# Patient Record
Sex: FEMALE | Race: WHITE | NOT HISPANIC OR LATINO | URBAN - METROPOLITAN AREA
[De-identification: names, ages, dates, MRNs, and addresses within clinical notes are randomized per-mention and may not be internally consistent; named-entity substitution may affect disease eponyms.]

---

## 2021-06-01 ENCOUNTER — EMERGENCY (EMERGENCY)
Facility: HOSPITAL | Age: 61
LOS: 1 days | Discharge: HOME | End: 2021-06-01
Attending: EMERGENCY MEDICINE
Payer: COMMERCIAL

## 2021-06-01 VITALS
HEIGHT: 68 IN | HEART RATE: 89 BPM | OXYGEN SATURATION: 98 % | RESPIRATION RATE: 16 BRPM | WEIGHT: 145.06 LBS | SYSTOLIC BLOOD PRESSURE: 121 MMHG | TEMPERATURE: 98 F | DIASTOLIC BLOOD PRESSURE: 74 MMHG

## 2021-06-01 LAB
ALBUMIN SERPL ELPH-MCNC: 4.4 G/DL — SIGNIFICANT CHANGE UP (ref 3.3–5.2)
ALP SERPL-CCNC: 85 U/L — SIGNIFICANT CHANGE UP (ref 40–120)
ALT FLD-CCNC: 22 U/L — SIGNIFICANT CHANGE UP
ANION GAP SERPL CALC-SCNC: 17 MMOL/L — SIGNIFICANT CHANGE UP (ref 5–17)
APTT BLD: 27.4 SEC — LOW (ref 27.5–35.5)
AST SERPL-CCNC: 28 U/L — SIGNIFICANT CHANGE UP
BASOPHILS # BLD AUTO: 0.05 K/UL — SIGNIFICANT CHANGE UP (ref 0–0.2)
BASOPHILS NFR BLD AUTO: 0.7 % — SIGNIFICANT CHANGE UP (ref 0–2)
BILIRUB SERPL-MCNC: 0.3 MG/DL — LOW (ref 0.4–2)
BLD GP AB SCN SERPL QL: SIGNIFICANT CHANGE UP
BUN SERPL-MCNC: 16.5 MG/DL — SIGNIFICANT CHANGE UP (ref 8–20)
CALCIUM SERPL-MCNC: 9.2 MG/DL — SIGNIFICANT CHANGE UP (ref 8.6–10.2)
CHLORIDE SERPL-SCNC: 102 MMOL/L — SIGNIFICANT CHANGE UP (ref 98–107)
CO2 SERPL-SCNC: 21 MMOL/L — LOW (ref 22–29)
CREAT SERPL-MCNC: 0.69 MG/DL — SIGNIFICANT CHANGE UP (ref 0.5–1.3)
EOSINOPHIL # BLD AUTO: 0.01 K/UL — SIGNIFICANT CHANGE UP (ref 0–0.5)
EOSINOPHIL NFR BLD AUTO: 0.1 % — SIGNIFICANT CHANGE UP (ref 0–6)
GLUCOSE SERPL-MCNC: 142 MG/DL — HIGH (ref 70–99)
HCT VFR BLD CALC: 37.1 % — SIGNIFICANT CHANGE UP (ref 34.5–45)
HGB BLD-MCNC: 12.8 G/DL — SIGNIFICANT CHANGE UP (ref 11.5–15.5)
IMM GRANULOCYTES NFR BLD AUTO: 0.4 % — SIGNIFICANT CHANGE UP (ref 0–1.5)
INR BLD: 0.96 RATIO — SIGNIFICANT CHANGE UP (ref 0.88–1.16)
LYMPHOCYTES # BLD AUTO: 1.02 K/UL — SIGNIFICANT CHANGE UP (ref 1–3.3)
LYMPHOCYTES # BLD AUTO: 15.2 % — SIGNIFICANT CHANGE UP (ref 13–44)
MCHC RBC-ENTMCNC: 29.2 PG — SIGNIFICANT CHANGE UP (ref 27–34)
MCHC RBC-ENTMCNC: 34.5 GM/DL — SIGNIFICANT CHANGE UP (ref 32–36)
MCV RBC AUTO: 84.5 FL — SIGNIFICANT CHANGE UP (ref 80–100)
MONOCYTES # BLD AUTO: 0.44 K/UL — SIGNIFICANT CHANGE UP (ref 0–0.9)
MONOCYTES NFR BLD AUTO: 6.5 % — SIGNIFICANT CHANGE UP (ref 2–14)
NEUTROPHILS # BLD AUTO: 5.17 K/UL — SIGNIFICANT CHANGE UP (ref 1.8–7.4)
NEUTROPHILS NFR BLD AUTO: 77.1 % — HIGH (ref 43–77)
PLATELET # BLD AUTO: 319 K/UL — SIGNIFICANT CHANGE UP (ref 150–400)
POTASSIUM SERPL-MCNC: 3.9 MMOL/L — SIGNIFICANT CHANGE UP (ref 3.5–5.3)
POTASSIUM SERPL-SCNC: 3.9 MMOL/L — SIGNIFICANT CHANGE UP (ref 3.5–5.3)
PROT SERPL-MCNC: 6.8 G/DL — SIGNIFICANT CHANGE UP (ref 6.6–8.7)
PROTHROM AB SERPL-ACNC: 11.2 SEC — SIGNIFICANT CHANGE UP (ref 10.6–13.6)
RBC # BLD: 4.39 M/UL — SIGNIFICANT CHANGE UP (ref 3.8–5.2)
RBC # FLD: 13.2 % — SIGNIFICANT CHANGE UP (ref 10.3–14.5)
SODIUM SERPL-SCNC: 139 MMOL/L — SIGNIFICANT CHANGE UP (ref 135–145)
TROPONIN T SERPL-MCNC: <0.01 NG/ML — SIGNIFICANT CHANGE UP (ref 0–0.06)
WBC # BLD: 6.72 K/UL — SIGNIFICANT CHANGE UP (ref 3.8–10.5)
WBC # FLD AUTO: 6.72 K/UL — SIGNIFICANT CHANGE UP (ref 3.8–10.5)

## 2021-06-01 PROCEDURE — 0042T: CPT

## 2021-06-01 PROCEDURE — 99220: CPT

## 2021-06-01 PROCEDURE — 70498 CT ANGIOGRAPHY NECK: CPT | Mod: 26,MA

## 2021-06-01 PROCEDURE — 70496 CT ANGIOGRAPHY HEAD: CPT | Mod: 26,MA

## 2021-06-01 PROCEDURE — 99284 EMERGENCY DEPT VISIT MOD MDM: CPT

## 2021-06-01 PROCEDURE — 93010 ELECTROCARDIOGRAM REPORT: CPT | Mod: 76

## 2021-06-01 PROCEDURE — 71045 X-RAY EXAM CHEST 1 VIEW: CPT | Mod: 26

## 2021-06-01 PROCEDURE — 70450 CT HEAD/BRAIN W/O DYE: CPT | Mod: 26,59,MA

## 2021-06-01 PROCEDURE — 93306 TTE W/DOPPLER COMPLETE: CPT | Mod: 26

## 2021-06-01 PROCEDURE — 99223 1ST HOSP IP/OBS HIGH 75: CPT

## 2021-06-01 RX ORDER — MECLIZINE HCL 12.5 MG
50 TABLET ORAL ONCE
Refills: 0 | Status: COMPLETED | OUTPATIENT
Start: 2021-06-01 | End: 2021-06-01

## 2021-06-01 RX ORDER — ONDANSETRON 8 MG/1
4 TABLET, FILM COATED ORAL ONCE
Refills: 0 | Status: COMPLETED | OUTPATIENT
Start: 2021-06-01 | End: 2021-06-01

## 2021-06-01 RX ORDER — BUPROPION HYDROCHLORIDE 150 MG/1
150 TABLET, EXTENDED RELEASE ORAL DAILY
Refills: 0 | Status: DISCONTINUED | OUTPATIENT
Start: 2021-06-01 | End: 2021-06-06

## 2021-06-01 RX ORDER — METOPROLOL TARTRATE 50 MG
100 TABLET ORAL ONCE
Refills: 0 | Status: COMPLETED | OUTPATIENT
Start: 2021-06-02 | End: 2021-06-02

## 2021-06-01 RX ORDER — METOPROLOL TARTRATE 50 MG
100 TABLET ORAL ONCE
Refills: 0 | Status: COMPLETED | OUTPATIENT
Start: 2021-06-01 | End: 2021-06-01

## 2021-06-01 RX ADMIN — Medication 50 MILLIGRAM(S): at 15:01

## 2021-06-01 RX ADMIN — ONDANSETRON 4 MILLIGRAM(S): 8 TABLET, FILM COATED ORAL at 15:02

## 2021-06-01 NOTE — ED PROVIDER NOTE - PROGRESS NOTE DETAILS
On reeval, pt reports that her dizziness has improved and is now sitting up in bed with eyes open, prior to receving any medications. States that chest pressure and L arm numbness is still present, but dizziness has resolved.     D/w code stroke neurologist dr. linton states that given pts improvement in sx and outside tpa window agrees with plan for no tpa. If pt remains asymptomatic can receive MRI in obs. - Natan Root, PGY-2

## 2021-06-01 NOTE — CONSULT NOTE ADULT - SUBJECTIVE AND OBJECTIVE BOX
[facility]                                        Neurology at [place]                                  [facility]                                      [address]                                           [place], [place], [address]                                                [phone]        CC: Dizziness.     HISTORY:  The patient is a 60y Female who presented with the complaint of dizziness.   She reports that the onset was around 8 am. She felt associated nausea and had some pressure in her chest.   She described some tingling down her left arm.   Upon arrival in the ER the stroke code was activated and I had spoken with the ER team Emily Root (resident)/Dayday (attending).     She is currently feeling much better although not quite back to normal.     PAST MEDICAL & SURGICAL HISTORY:  Depression  No significant past surgical history    MEDICATION PRIOR TO ADMISSION:  Wellbutrin    Allergies  codeine (Unknown)    SOCIAL HISTORY:  Non smoker.     FAMILY HISTORY:  No known history of stroke (mother/father).    ROS:  Constitutional: The patient denies fevers or weight changes.  Neuro: As per HPI.  Eyes: Denies blurry vision.  Ears/nose/throat: Denies Tinnitus.   Cardiac: Denies chest pain. Denies palpitations.  Respiratory: Denies shortness of breath.  GI: Denies abdominal pain, nausea, or vomiting.  : Denies change in urinary pattern.  Integumentary: Denies rash.  Psych: Denies recent mood changes.  Heme: denies easy bleeding/bruising.    Exam:  Vital Signs Last 24 Hrs  T(C): 36.8 (01 Jun 2021 13:39), Max: 36.8 (01 Jun 2021 13:39)  T(F): 98.3 (01 Jun 2021 13:39), Max: 98.3 (01 Jun 2021 13:39)  HR: 89 (01 Jun 2021 13:39) (89 - 89)  BP: 121/74 (01 Jun 2021 13:39) (121/74 - 121/74)  RR: 16 (01 Jun 2021 13:39) (16 - 16)  SpO2: 98% (01 Jun 2021 13:39) (98% - 98%)  General: NAD.   Carotid bruits absent.     Mental status: The patient is awake, alert, and fully oriented. There is no aphasia. Attention span is normal. Patient is aware of current events.     Cranial nerves: There is no papilledema. Pupils react symmetrically to light. There is no visual field deficit to confrontation. Extraocular motion is full with no nystagmus.  Facial sensation is intact. Facial musculature is symmetric. Palate elevates symmetrically. Tongue is midline.    Motor: There is normal bulk and tone.  Strength is 5/5 in the right arm and leg.   Strength is 5/5 in the left arm and leg.    Sensation: Intact to light touch and pin. There is no extinction to double simultaneous stimulation.    Reflexes: 2+ throughout and plantar responses are flexor.    Cerebellar: There is no dysmetria on finger to nose testing.    [facility] [email or web address]] SS:   Date: 6/1/21  Time:   1a) Level of consciousness (0-3): 0  1b) Questions (0-2): 0  1c) Commands (0-2): 0  2  ) Gaze (0-2): 0  3  ) Visual field (0-3): 0  4  ) Facial palsy (0-3): 0  Motor  5a) Left arm (0-4): 0  5b) Right arm (0-4): 0  6a) Left leg (0-4): 0  6b) Right leg (0-4): 0  7  ) Ataxia (0-2): 0  Sensory  8  ) Sensory (0-2): 0  Speech  9  ) Language (0-3): 0  10) Dysarthria (0-2): 0  Extinction  11) Extinction/inattention (0-2): 0    Total score: 0    Prestroke Modified Payne: 0    (0: No symptoms and no disability.  1: No significant disability despite symptoms; able to carry out all usual duties and activities.  2: Slight disability; unable to carry out all previous activity but able to look after own affairs without assistance.  3: Moderate disability; requiring some help but able to walk without assistance.   4: Moderately severe disability; unable to walk without assistance and unable to attend to own bodily needs without assistance.  5: Severe disability; bedridden, incontinent and requiring constant nursing care and attention.   6: Dead. )     LABS:                         12.8   6.72  )-----------( 319      ( 01 Jun 2021 14:20 )             37.1       06-01    139  |  102  |  16.5  ----------------------------<  142<H>  3.9   |  21.0<L>  |  0.69    Ca    9.2      01 Jun 2021 14:20    TPro  6.8  /  Alb  4.4  /  TBili  0.3<L>  /  DBili  x   /  AST  28  /  ALT  22  /  AlkPhos  85  06-01      PT/INR - ( 01 Jun 2021 14:20 )   PT: 11.2 sec;   INR: 0.96 ratio         PTT - ( 01 Jun 2021 14:20 )  PTT:27.4 sec    RADIOLOGY   CT head images reviewed (and concur with report): There is no acute pathology.   CT-A negative for large vessel occlusion.  CT-P negative for core/penumbra.

## 2021-06-01 NOTE — ED CDU PROVIDER INITIAL DAY NOTE - CROS ED SKIN ALL NEG
----- Message from Angela Singh sent at 5/1/2018  7:40 AM CDT -----  Contact: Self Call  [phone]  Patient stated his pharmacy said you were holding up getting his medication filled due to them needing a diagnostic code for the medication. He need his Rx today because he has taken his last.       [facility]  [phone] (Phone)    [phone] (Fax)  
See previous call to [facility].  
negative...

## 2021-06-01 NOTE — ED PROVIDER NOTE - CLINICAL SUMMARY MEDICAL DECISION MAKING FREE TEXT BOX
Pt presenting with acute onset of dizziness and L arm numbness with lkw today at 0800. Code stroke called, pt taken directly to CT scan, CTH, CTA h/n, CTP, labs, trop, vbg, ekg ordered. Given concerning hx of chest pain will evaluate for acs with repeat trops and cardio cx.

## 2021-06-01 NOTE — CONSULT NOTE ADULT - SUBJECTIVE AND OBJECTIVE BOX
[place] [facility]                                                               Office:  [address] [place] [address]                                                              Telephone: [phone]. Fax:[phone]                                                                        CARDIOLOGY CONSULTATION NOTE                                                                                             Consult requested by: Dr. Ivett Gama   Reason for Consultation: Chest Pain  PMD:  Primary Cardiology:  History obtained by: Patient and medical record   obtained: No    Chief complaint:    Patient is a 60y old  Female who presents with a chief complaint of chest pain      HPI: Pt is a 59 y/o female with medical history of         REVIEW OF SYMPTOMS:     CONSTITUTIONAL: No fever, weight loss, or fatigue  ENMT:  No difficulty hearing, tinnitus, vertigo; No sinus or throat pain  NECK: No pain or stiffness  CARDIOVASCULAR: See HPI  RESPIRATORY: No Dyspnea on exertion, Shortness of breath, cough, wheezing  : No dysuria, no hematuria   GI: No dark color stool, no melena, no diarrhea, no constipation, no abdominal pain   NEURO: No headache, no dizziness, no slurred speech   MUSCULOSKELETAL: No joint pain or swelling; No muscle, back, or extremity pain  PSYCH: No agitation, no anxiety.    ALL OTHER REVIEW OF SYSTEMS ARE NEGATIVE.      PREVIOUS DIAGNOSTIC TESTING  ECHO FINDINGS:       STRESS FINDINGS:      CATHETERIZATION FINDINGS:         ALLERGIES: Allergies    codeine (Unknown)    Intolerances          PAST MEDICAL HISTORY  Depression        PAST SURGICAL HISTORY  No significant past surgical history        FAMILY HISTORY:      SOCIAL HISTORY:  Denies smoking/alcohol/drugs  CIGARETTES:     ALCOHOL:  DRUGS:      CURRENT MEDICATIONS:           HOME MEDICATIONS:        Vital Signs Last 24 Hrs  T(C): 36.8 (01 Jun 2021 13:39), Max: 36.8 (01 Jun 2021 13:39)  T(F): 98.3 (01 Jun 2021 13:39), Max: 98.3 (01 Jun 2021 13:39)  HR: 89 (01 Jun 2021 13:39) (89 - 89)  BP: 121/74 (01 Jun 2021 13:39) (121/74 - 121/74)  RR: 16 (01 Jun 2021 13:39) (16 - 16)  SpO2: 98% (01 Jun 2021 13:39) (98% - 98%)      PHYSICAL EXAM:  Constitutional: Comfortable . No acute distress.   HEENT: Atraumatic and normocephalic , neck is supple . no JVD. No carotid bruit. PEERL   CNS: A&Ox3. No focal deficits. EOMI.   Lymph Nodes: Cervical : Not palpable.  Respiratory: CTAB  Cardiovascular: S1S2 RRR. No murmur/rubs or gallop.  Gastrointestinal: Soft non-tender and non distended . +Bowel sounds. negative Tao's sign.  Extremities: No edema.   Psychiatric: Calm . no agitation.  Skin: No skin rash/ulcers visualized to face, hands or feet.    Intake and output:     LABS:                        12.8   6.72  )-----------( 319      ( 01 Jun 2021 14:20 )             37.1     06-01    139  |  102  |  16.5  ----------------------------<  142<H>  3.9   |  21.0<L>  |  0.69    Ca    9.2      01 Jun 2021 14:20    TPro  6.8  /  Alb  4.4  /  TBili  0.3<L>  /  DBili  x   /  AST  28  /  ALT  22  /  AlkPhos  85  06-01    CARDIAC MARKERS ( 01 Jun 2021 14:20 )  x     / <0.01 ng/mL / x     / x     / x        ;p-BNP=  PT/INR - ( 01 Jun 2021 14:20 )   PT: 11.2 sec;   INR: 0.96 ratio         PTT - ( 01 Jun 2021 14:20 )  PTT:27.4 sec      INTERPRETATION OF TELEMETRY:   ECG: NSR HR @ 88,     RADIOLOGY & ADDITIONAL STUDIES:    X-ray:  < from: Xray Chest 1 View- PORTABLE-Urgent (06.01.21 @ 14:39) >  IMPRESSION:  No acute radiographic findings          CT scan: < from: CT Angio Head w/ IV Cont (06.01.21 @ 14:39) >    IMPRESSION:    CT PERFUSION demonstrated: No core infarct. No active ischemia. Suboptimal arterial bolus.  If symptoms persist consider follow up head CT or MRI, MRA  if no contraindication.    CTA COW:  Patent intracranial circulation without flow limiting stenosis.    CTA NECK: Patent, ECAs, ICAs, no  hemodynamically significant stenosis at  ICA origins by NASCET criteria.  Bilateral vertebral arteries are patent without flow limiting stenosis.    < from: CT Brain Stroke Protocol (06.01.21 @ 14:26) >    IMPRESSION:  HEAD CT: Mild volume loss, microvascular disease, no acute hemorrhage or midline shift.  Suboptimal positioning.          MRI: PENDING                                                                          [place] [facility]                                                               Office:  [address] [address]                                                              Telephone: [phone]. Fax:[phone]                                                                        CARDIOLOGY CONSULTATION NOTE                                                                                             Consult requested by: Dr. Ivett Gama   Reason for Consultation: Chest Pain  PMD:  Primary Cardiology:  History obtained by: Patient and medical record   obtained: No    Chief complaint:    Patient is a 60y old  Female who presents with a chief complaint of chest pain      HPI: Pt is a 61 y/o female with hx of depression who presents to [facility] for chest pain and dizziness.        REVIEW OF SYMPTOMS:     CONSTITUTIONAL: No fever, weight loss, or fatigue  ENMT:  No difficulty hearing, tinnitus, vertigo; No sinus or throat pain  NECK: No pain or stiffness  CARDIOVASCULAR: See HPI  RESPIRATORY: No Dyspnea on exertion, Shortness of breath, cough, wheezing  : No dysuria, no hematuria   GI: No dark color stool, no melena, no diarrhea, no constipation, no abdominal pain   NEURO: No headache, no dizziness, no slurred speech   MUSCULOSKELETAL: No joint pain or swelling; No muscle, back, or extremity pain  PSYCH: No agitation, no anxiety.    ALL OTHER REVIEW OF SYSTEMS ARE NEGATIVE.      PREVIOUS DIAGNOSTIC TESTING  ECHO FINDINGS:       STRESS FINDINGS:      CATHETERIZATION FINDINGS:         ALLERGIES: Allergies    codeine (Unknown)    Intolerances          PAST MEDICAL HISTORY  Depression        PAST SURGICAL HISTORY  No significant past surgical history        FAMILY HISTORY:      SOCIAL HISTORY:  Denies smoking/alcohol/drugs  CIGARETTES:     ALCOHOL:  DRUGS:      CURRENT MEDICATIONS:           HOME MEDICATIONS:        Vital Signs Last 24 Hrs  T(C): 36.8 (01 Jun 2021 13:39), Max: 36.8 (01 Jun 2021 13:39)  T(F): 98.3 (01 Jun 2021 13:39), Max: 98.3 (01 Jun 2021 13:39)  HR: 89 (01 Jun 2021 13:39) (89 - 89)  BP: 121/74 (01 Jun 2021 13:39) (121/74 - 121/74)  RR: 16 (01 Jun 2021 13:39) (16 - 16)  SpO2: 98% (01 Jun 2021 13:39) (98% - 98%)      PHYSICAL EXAM:  Constitutional: Comfortable . No acute distress.   HEENT: Atraumatic and normocephalic , neck is supple . no JVD. No carotid bruit. PEERL   CNS: A&Ox3. No focal deficits. EOMI.   Lymph Nodes: Cervical : Not palpable.  Respiratory: CTAB  Cardiovascular: S1S2 RRR. No murmur/rubs or gallop.  Gastrointestinal: Soft non-tender and non distended . +Bowel sounds. negative Tao's sign.  Extremities: No edema.   Psychiatric: Calm . no agitation.  Skin: No skin rash/ulcers visualized to face, hands or feet.    Intake and output:     LABS:                        12.8   6.72  )-----------( 319      ( 01 Jun 2021 14:20 )             37.1     06-01    139  |  102  |  16.5  ----------------------------<  142<H>  3.9   |  21.0<L>  |  0.69    Ca    9.2      01 Jun 2021 14:20    TPro  6.8  /  Alb  4.4  /  TBili  0.3<L>  /  DBili  x   /  AST  28  /  ALT  22  /  AlkPhos  85  06-01    CARDIAC MARKERS ( 01 Jun 2021 14:20 )  x     / <0.01 ng/mL / x     / x     / x        ;p-BNP=  PT/INR - ( 01 Jun 2021 14:20 )   PT: 11.2 sec;   INR: 0.96 ratio         PTT - ( 01 Jun 2021 14:20 )  PTT:27.4 sec      INTERPRETATION OF TELEMETRY:   ECG: NSR HR @ 88,     RADIOLOGY & ADDITIONAL STUDIES:    X-ray:  < from: Xray Chest 1 View- PORTABLE-Urgent (06.01.21 @ 14:39) >  IMPRESSION:  No acute radiographic findings          CT scan: < from: CT Angio Head w/ IV Cont (06.01.21 @ 14:39) >    IMPRESSION:    CT PERFUSION demonstrated: No core infarct. No active ischemia. Suboptimal arterial bolus.  If symptoms persist consider follow up head CT or MRI, MRA  if no contraindication.    CTA COW:  Patent intracranial circulation without flow limiting stenosis.    CTA NECK: Patent, ECAs, ICAs, no  hemodynamically significant stenosis at  ICA origins by NASCET criteria.  Bilateral vertebral arteries are patent without flow limiting stenosis.    < from: CT Brain Stroke Protocol (06.01.21 @ 14:26) >    IMPRESSION:  HEAD CT: Mild volume loss, microvascular disease, no acute hemorrhage or midline shift.  Suboptimal positioning.          MRI: PENDING                                                                          [place] [facility]                                                               Office:  [address] [address]                                                              Telephone: [phone]. Fax:[phone]                                                                        CARDIOLOGY CONSULTATION NOTE                                                                                             Consult requested by: Dr. Ivett Gama   Reason for Consultation: Chest Pain  PMD: None  Primary Cardiology: None  History obtained by: Patient and medical record   obtained: No    Chief complaint:    Patient is a 60y old  Female who presents with a chief complaint of chest pain      HPI: Pt is a 59 y/o female with hx of depression who presents to [facility]ED for chest pain and dizziness. Pt states that this morning she had 2 episodes of nausea and vomitting. Pt continued to feel unwell but she had to get her taxes done so she drove, but as she was driving she started feeling left arm tingling/numbness with associated chest pressure/tighness. Pt called neighbor to take her to hospital because she felt as though she was having a heart attack. While patient was being driven to hospital, she vomitted again and felt dizziness and neighbor stopped vehicle and called 911.  In ED, Trop#1-negative, ECG-NSR HR @ 83, CT Head- Mild volume loss, microvascular disease, no acute hemorrhage or midline shift. Suboptimal positioning, Xray-No acute radiographic findings.         REVIEW OF SYMPTOMS:     CONSTITUTIONAL: No fever, weight loss, or fatigue  ENMT:  No difficulty hearing, tinnitus, vertigo; No sinus or throat pain  NECK: No pain or stiffness  CARDIOVASCULAR: See HPI  RESPIRATORY: No Dyspnea on exertion, Shortness of breath, cough, wheezing  : No dysuria, no hematuria   GI: No dark color stool, no melena, no diarrhea, no constipation, no abdominal pain   NEURO: No headache, no dizziness, no slurred speech   MUSCULOSKELETAL: No joint pain or swelling; No muscle, back, or extremity pain  PSYCH: No agitation, no anxiety.    ALL OTHER REVIEW OF SYSTEMS ARE NEGATIVE.      ALLERGIES: Allergies    codeine (Unknown)    Intolerances          PAST MEDICAL HISTORY  Depression        PAST SURGICAL HISTORY  No significant past surgical history        FAMILY HISTORY:  CABG-Father age 70    SOCIAL HISTORY:    CIGARETTES:   Denies  ALCOHOL: Minimal  DRUGS: Denies      CURRENT MEDICATIONS:           HOME MEDICATIONS:  Prozac  Welbutrin      Vital Signs Last 24 Hrs  T(C): 36.8 (01 Jun 2021 13:39), Max: 36.8 (01 Jun 2021 13:39)  T(F): 98.3 (01 Jun 2021 13:39), Max: 98.3 (01 Jun 2021 13:39)  HR: 89 (01 Jun 2021 13:39) (89 - 89)  BP: 121/74 (01 Jun 2021 13:39) (121/74 - 121/74)  RR: 16 (01 Jun 2021 13:39) (16 - 16)  SpO2: 98% (01 Jun 2021 13:39) (98% - 98%)      PHYSICAL EXAM:  Constitutional: Comfortable . No acute distress.   HEENT: Atraumatic and normocephalic , neck is supple . no JVD. No carotid bruit. PEERL   CNS: A&Ox3. No focal deficits. EOMI, left sided nystagmus when looking to the left   Lymph Nodes: Cervical : Not palpable.  Respiratory: CTAB  Cardiovascular: S1S2 RRR. No murmur/rubs or gallop.  Gastrointestinal: Soft non-tender and non distended . +Bowel sounds. negative Tao's sign.  Extremities: No edema.   Psychiatric: Calm . no agitation.  Skin: No skin rash/ulcers visualized to face, hands or feet.    Intake and output:     LABS:                        12.8   6.72  )-----------( 319      ( 01 Jun 2021 14:20 )             37.1     06-01    139  |  102  |  16.5  ----------------------------<  142<H>  3.9   |  21.0<L>  |  0.69    Ca    9.2      01 Jun 2021 14:20    TPro  6.8  /  Alb  4.4  /  TBili  0.3<L>  /  DBili  x   /  AST  28  /  ALT  22  /  AlkPhos  85  06-01    CARDIAC MARKERS ( 01 Jun 2021 14:20 )  x     / <0.01 ng/mL / x     / x     / x        ;p-BNP=  PT/INR - ( 01 Jun 2021 14:20 )   PT: 11.2 sec;   INR: 0.96 ratio         PTT - ( 01 Jun 2021 14:20 )  PTT:27.4 sec      INTERPRETATION OF TELEMETRY: No tele  ECG: NSR HR @ 83    RADIOLOGY & ADDITIONAL STUDIES:    X-ray:  < from: Xray Chest 1 View- PORTABLE-Urgent (06.01.21 @ 14:39) >  IMPRESSION:  No acute radiographic findings          CT scan: < from: CT Angio Head w/ IV Cont (06.01.21 @ 14:39) >    IMPRESSION:    CT PERFUSION demonstrated: No core infarct. No active ischemia. Suboptimal arterial bolus.  If symptoms persist consider follow up head CT or MRI, MRA  if no contraindication.    CTA COW:  Patent intracranial circulation without flow limiting stenosis.    CTA NECK: Patent, ECAs, ICAs, no  hemodynamically significant stenosis at  ICA origins by NASCET criteria.  Bilateral vertebral arteries are patent without flow limiting stenosis.    < from: CT Brain Stroke Protocol (06.01.21 @ 14:26) >    IMPRESSION:  HEAD CT: Mild volume loss, microvascular disease, no acute hemorrhage or midline shift.  Suboptimal positioning.          MRI: PENDING

## 2021-06-01 NOTE — ED CDU PROVIDER INITIAL DAY NOTE - MEDICAL DECISION MAKING DETAILS
60f with acute onset of dizziness / chest pain / L arm pain starting at approximately 8am. Code stroke initiated in ED and had no emergent findings on CT head, CTA head / neck, CT perfusion scan. Pt placed in CDU for MR head. Cards evaluated pt and pt to have TTE / cardiac CTA performed.

## 2021-06-01 NOTE — ED PROVIDER NOTE - OBJECTIVE STATEMENT
59 y/o F pt with hx of depression presenting today with c/o dizziness onset this am with lkw approx 8am. Pt states that she woke up feeling at baseline, then approx 8am began to feel markedly dizzy with associated nausea. Shortly after, pt developed a L chest pressure with associated L arm numbness which continued to worsen. Dizziness continued to worsen and pt was soon unable ambulate without assistance. Pt denies hx of similar complaint. Reports diziness is worsened with movement 59 y/o F pt with hx of depression presenting today with c/o dizziness onset this am with lkw approx 8am. Pt states that she woke up feeling at baseline, then approx 8am began to feel markedly dizzy with associated nausea. Shortly after, pt developed a L chest pressure with associated L arm numbness which continued to worsen. Dizziness continued to worsen and pt was soon unable ambulate without assistance. Pt denies hx of similar complaint. Reports diziness is worsened with movement, no hx of vertigo. Pt denies any dyspnea, fevers, abdominal pain, dysuria, headache, cough, congestion, sore throat, neck pain, back pain, weakness, numbness, tingling, syncope, or other complaint. 60 year old F pt with hx of depression presenting today with c/o dizziness onset this am with lkw approx 8am. Pt states that she woke up feeling at baseline, then approx 8am began to feel markedly dizzy with associated nausea. Shortly after, pt developed a L chest pressure with associated L arm numbness which continued to worsen. Dizziness continued to worsen and pt was soon unable ambulate without assistance. Pt denies hx of similar complaint. Reports diziness is worsened with movement, no hx of vertigo. Pt denies any dyspnea, fevers, abdominal pain, dysuria, headache, cough, congestion, sore throat, neck pain, back pain, weakness, numbness, tingling, syncope, or other complaint.

## 2021-06-01 NOTE — ED ADULT NURSE NOTE - OBJECTIVE STATEMENT
pt in no apparent distress, pt co N/V and dizziness that started today, pt also co chest pressure, code stroke activated on pt, MD Root at bedside, pt denies any fevers, chill, SOB

## 2021-06-01 NOTE — ED ADULT NURSE NOTE - NSFALLRSKASSESSTYPE_ED_ALL_ED
Abdominal pain, is \"boiling up\", since yesterday, with vomiting and diarrhea.  Tried to be seen at [facility] ED last night, but wait too long.  Burp tastes like eggs.  Nasty.  Also tired, sleeping a lot, for a long time.  Off iron pills.  Ran out.  Also has headaches.   Initial (On Arrival)

## 2021-06-01 NOTE — CONSULT NOTE ADULT - ATTENDING COMMENTS
pt seen and examined.  Plan of care was DOREEN NP.  Pt with Hx of migraines, with some tingling in left arm. She had similar symptoms except for HA today and had two episodes of vomiting. She then developed left sided chest pressure which she never had.   She had CTA of head and neck, MRI is pending.  Her symptoms improved with use of antivert. No more CP  EKG not ischemic and CE x1 negative.   Plan for cardiac CT in AM. BB to control Hr prior to cardiac CT.   Recycle cardiac enzymes.   We will follow with u   I agree with a/p.

## 2021-06-01 NOTE — ED PROVIDER NOTE - PHYSICAL EXAMINATION
Gen: no acute distress  Head: normocephalic, atraumatic  Lung: CTAB, no respiratory distress, no wheezing, rales, rhonchi  CV: normal s1/s2, rrr,   Abd: soft, non-tender, non-distended  MSK: No edema, no visible deformities, full range of motion in all 4 extremities  Neuro: No focal neurologic deficits, 5/5 strength to all extremities, decreased sensation to lateral L forarm, otherwise sensation intact, CN 2-12 intact, NIHSS 1  Skin: No rash   Psych: normal affect Gen: no acute distress  Head: normocephalic, atraumatic  Lung: CTAB, no respiratory distress, no wheezing, rales, rhonchi  CV: normal s1/s2, rrr,   Abd: soft, non-tender, non-distended  MSK: No edema, no visible deformities, full range of motion in all 4 extremities  Neuro: No focal neurologic deficits, 5/5 strength to all extremities, decreased sensation to lateral L forarm, otherwise sensation intact, CN 2-12 intact, NIHSS 1  Skin: No rash   Psych: normal affect.

## 2021-06-01 NOTE — ED CDU PROVIDER INITIAL DAY NOTE - OBJECTIVE STATEMENT
60 year old F pt with hx of depression presenting today with c/o dizziness onset this am with lkw approx 8am. Pt states that she woke up feeling at baseline, then approx 8am began to feel markedly dizzy with associated nausea. Shortly after, pt developed a L chest pressure with associated L arm numbness which continued to worsen. Dizziness continued to worsen and pt was soon unable ambulate without assistance. Pt denies hx of similar complaint. Reports diziness is worsened with movement, no hx of vertigo. Pt denies any dyspnea, fevers, abdominal pain, dysuria, headache, cough, congestion, sore throat, neck pain, back pain, weakness, numbness, tingling, syncope, or other complaint.

## 2021-06-01 NOTE — ED CDU PROVIDER INITIAL DAY NOTE - ATTENDING CONTRIBUTION TO CARE
seen with acp  pt had dizziness, also strange sensation LUE  had full code stroke and cardio evaluation  [facility] cardio consulting and neurology  plan is MR tomorrow  agree with care plan

## 2021-06-01 NOTE — ED ADULT TRIAGE NOTE - CHIEF COMPLAINT QUOTE
sudden onset nausea around 1030am with left sided chest pain radiating down to left arm. 4 baby ASA via EMS

## 2021-06-01 NOTE — CONSULT NOTE ADULT - ASSESSMENT
The patient is a 60y Female with with dizziness and left arm paresthesias in setting of left sided chest pressure.     Dizziness.   CT negative.  Would not suggest t-PA as outside time window. (Also has mild non disabling deficit).   Would suggest MRI brain.   Lipid panel.  Aspirin daily.   Statin if LDL> 70    Left arm tingling.   Resolved.   Would suggest cardiac work up for ischemia.     Case discussed with ER team (Dr Root resident and Dr Naylor attending).    
Pt is a 59 y/o female with hx of depression who presents to [facility]-ED for chest pain and dizziness. Pt states that this morning she had 2 episodes of nausea and vomitting. Pt continued to feel unwell but she had to get her taxes done so she drove, but as she was driving she started feeling left arm tingling/numbness with associated chest pressure/tighness. Pt called neighbor to take her to hospital because she felt as though she was having a heart attack. While patient was being driven to hospital, she vomitted again and felt dizziness and neighbor stopped vehicle and called 911.  In ED, Trop#1-negative, ECG-NSR HR @ 83, CT Head- Mild volume loss, microvascular disease, no acute hemorrhage or midline shift. Suboptimal positioning, Xray-No acute radiographic findings.     Chest Pain  -ECG-NSR HR @ 83  -Trop#1-negative  -Xray-No acute radiographic findings  -Trend trop  -Plan for CCTA in AM  -Lopressor 100mg ordered to be given once prior to CCTA  -Cont. tele monitoring  -FLP in AM    Dizziness  -CT-Mild volume loss, microvascular disease, no acute hemorrhage or midline shift. Suboptimal positioning  -MR Head- pending  - +nystagmus when looking to the left  - Pt dizziness likely d/t vertigo

## 2021-06-02 VITALS
SYSTOLIC BLOOD PRESSURE: 108 MMHG | HEART RATE: 66 BPM | OXYGEN SATURATION: 98 % | DIASTOLIC BLOOD PRESSURE: 66 MMHG | RESPIRATION RATE: 17 BRPM

## 2021-06-02 LAB
ANION GAP SERPL CALC-SCNC: 10 MMOL/L — SIGNIFICANT CHANGE UP (ref 5–17)
APPEARANCE UR: ABNORMAL
BACTERIA # UR AUTO: ABNORMAL
BILIRUB UR-MCNC: NEGATIVE — SIGNIFICANT CHANGE UP
BUN SERPL-MCNC: 17.6 MG/DL — SIGNIFICANT CHANGE UP (ref 8–20)
CALCIUM SERPL-MCNC: 8.4 MG/DL — LOW (ref 8.6–10.2)
CHLORIDE SERPL-SCNC: 106 MMOL/L — SIGNIFICANT CHANGE UP (ref 98–107)
CHOLEST SERPL-MCNC: 203 MG/DL — HIGH
CO2 SERPL-SCNC: 26 MMOL/L — SIGNIFICANT CHANGE UP (ref 22–29)
COLOR SPEC: YELLOW — SIGNIFICANT CHANGE UP
COMMENT - URINE: SIGNIFICANT CHANGE UP
CREAT SERPL-MCNC: 0.64 MG/DL — SIGNIFICANT CHANGE UP (ref 0.5–1.3)
DIFF PNL FLD: ABNORMAL
EPI CELLS # UR: SIGNIFICANT CHANGE UP
GLUCOSE SERPL-MCNC: 95 MG/DL — SIGNIFICANT CHANGE UP (ref 70–99)
GLUCOSE UR QL: NEGATIVE MG/DL — SIGNIFICANT CHANGE UP
HDLC SERPL-MCNC: 99 MG/DL — SIGNIFICANT CHANGE UP
KETONES UR-MCNC: ABNORMAL
LEUKOCYTE ESTERASE UR-ACNC: ABNORMAL
LIPID PNL WITH DIRECT LDL SERPL: 95 MG/DL — SIGNIFICANT CHANGE UP
NITRITE UR-MCNC: NEGATIVE — SIGNIFICANT CHANGE UP
NON HDL CHOLESTEROL: 104 MG/DL — SIGNIFICANT CHANGE UP
PH UR: 6 — SIGNIFICANT CHANGE UP (ref 5–8)
POTASSIUM SERPL-MCNC: 4.2 MMOL/L — SIGNIFICANT CHANGE UP (ref 3.5–5.3)
POTASSIUM SERPL-SCNC: 4.2 MMOL/L — SIGNIFICANT CHANGE UP (ref 3.5–5.3)
PROT UR-MCNC: 30 MG/DL
RBC CASTS # UR COMP ASSIST: SIGNIFICANT CHANGE UP /HPF (ref 0–4)
SODIUM SERPL-SCNC: 141 MMOL/L — SIGNIFICANT CHANGE UP (ref 135–145)
SP GR SPEC: 1.02 — SIGNIFICANT CHANGE UP (ref 1.01–1.02)
TRIGL SERPL-MCNC: 43 MG/DL — SIGNIFICANT CHANGE UP
UROBILINOGEN FLD QL: 1 MG/DL
WBC UR QL: SIGNIFICANT CHANGE UP

## 2021-06-02 PROCEDURE — 96375 TX/PRO/DX INJ NEW DRUG ADDON: CPT

## 2021-06-02 PROCEDURE — 99213 OFFICE O/P EST LOW 20 MIN: CPT

## 2021-06-02 PROCEDURE — 99285 EMERGENCY DEPT VISIT HI MDM: CPT | Mod: 25

## 2021-06-02 PROCEDURE — 87086 URINE CULTURE/COLONY COUNT: CPT

## 2021-06-02 PROCEDURE — 80053 COMPREHEN METABOLIC PANEL: CPT

## 2021-06-02 PROCEDURE — 75574 CT ANGIO HRT W/3D IMAGE: CPT | Mod: 26,ME

## 2021-06-02 PROCEDURE — 80061 LIPID PANEL: CPT

## 2021-06-02 PROCEDURE — 71045 X-RAY EXAM CHEST 1 VIEW: CPT

## 2021-06-02 PROCEDURE — 81001 URINALYSIS AUTO W/SCOPE: CPT

## 2021-06-02 PROCEDURE — 96374 THER/PROPH/DIAG INJ IV PUSH: CPT

## 2021-06-02 PROCEDURE — 85730 THROMBOPLASTIN TIME PARTIAL: CPT

## 2021-06-02 PROCEDURE — 75574 CT ANGIO HRT W/3D IMAGE: CPT

## 2021-06-02 PROCEDURE — 85610 PROTHROMBIN TIME: CPT

## 2021-06-02 PROCEDURE — 70551 MRI BRAIN STEM W/O DYE: CPT | Mod: 26,MA

## 2021-06-02 PROCEDURE — 93306 TTE W/DOPPLER COMPLETE: CPT

## 2021-06-02 PROCEDURE — G0378: CPT

## 2021-06-02 PROCEDURE — 86901 BLOOD TYPING SEROLOGIC RH(D): CPT

## 2021-06-02 PROCEDURE — 84484 ASSAY OF TROPONIN QUANT: CPT

## 2021-06-02 PROCEDURE — 86900 BLOOD TYPING SEROLOGIC ABO: CPT

## 2021-06-02 PROCEDURE — 36415 COLL VENOUS BLD VENIPUNCTURE: CPT

## 2021-06-02 PROCEDURE — 96361 HYDRATE IV INFUSION ADD-ON: CPT

## 2021-06-02 PROCEDURE — 70450 CT HEAD/BRAIN W/O DYE: CPT

## 2021-06-02 PROCEDURE — 70498 CT ANGIOGRAPHY NECK: CPT

## 2021-06-02 PROCEDURE — 0042T: CPT

## 2021-06-02 PROCEDURE — 85025 COMPLETE CBC W/AUTO DIFF WBC: CPT

## 2021-06-02 PROCEDURE — 70496 CT ANGIOGRAPHY HEAD: CPT

## 2021-06-02 PROCEDURE — 93005 ELECTROCARDIOGRAM TRACING: CPT

## 2021-06-02 PROCEDURE — 86850 RBC ANTIBODY SCREEN: CPT

## 2021-06-02 PROCEDURE — 99217: CPT

## 2021-06-02 PROCEDURE — 70551 MRI BRAIN STEM W/O DYE: CPT

## 2021-06-02 PROCEDURE — 80048 BASIC METABOLIC PNL TOTAL CA: CPT

## 2021-06-02 PROCEDURE — G1004: CPT

## 2021-06-02 PROCEDURE — 99233 SBSQ HOSP IP/OBS HIGH 50: CPT

## 2021-06-02 RX ORDER — KETOROLAC TROMETHAMINE 30 MG/ML
30 SYRINGE (ML) INJECTION ONCE
Refills: 0 | Status: DISCONTINUED | OUTPATIENT
Start: 2021-06-02 | End: 2021-06-02

## 2021-06-02 RX ORDER — METOCLOPRAMIDE HCL 10 MG
10 TABLET ORAL ONCE
Refills: 0 | Status: COMPLETED | OUTPATIENT
Start: 2021-06-02 | End: 2021-06-02

## 2021-06-02 RX ORDER — METOCLOPRAMIDE HCL 10 MG
10 TABLET ORAL ONCE
Refills: 0 | Status: DISCONTINUED | OUTPATIENT
Start: 2021-06-02 | End: 2021-06-02

## 2021-06-02 RX ORDER — ASPIRIN/CALCIUM CARB/MAGNESIUM 324 MG
1 TABLET ORAL
Qty: 30 | Refills: 0
Start: 2021-06-02 | End: 2021-07-01

## 2021-06-02 RX ORDER — SODIUM CHLORIDE 9 MG/ML
500 INJECTION INTRAMUSCULAR; INTRAVENOUS; SUBCUTANEOUS ONCE
Refills: 0 | Status: COMPLETED | OUTPATIENT
Start: 2021-06-02 | End: 2021-06-02

## 2021-06-02 RX ORDER — MECLIZINE HCL 12.5 MG
25 TABLET ORAL EVERY 8 HOURS
Refills: 0 | Status: DISCONTINUED | OUTPATIENT
Start: 2021-06-02 | End: 2021-06-06

## 2021-06-02 RX ORDER — MECLIZINE HCL 12.5 MG
1 TABLET ORAL
Qty: 15 | Refills: 0
Start: 2021-06-02

## 2021-06-02 RX ADMIN — SODIUM CHLORIDE 500 MILLILITER(S): 9 INJECTION INTRAMUSCULAR; INTRAVENOUS; SUBCUTANEOUS at 11:44

## 2021-06-02 RX ADMIN — Medication 100 MILLIGRAM(S): at 11:36

## 2021-06-02 RX ADMIN — Medication 25 MILLIGRAM(S): at 11:44

## 2021-06-02 RX ADMIN — Medication 30 MILLIGRAM(S): at 11:47

## 2021-06-02 RX ADMIN — Medication 30 MILLIGRAM(S): at 12:15

## 2021-06-02 RX ADMIN — SODIUM CHLORIDE 500 MILLILITER(S): 9 INJECTION INTRAMUSCULAR; INTRAVENOUS; SUBCUTANEOUS at 12:44

## 2021-06-02 NOTE — ED CDU PROVIDER DISPOSITION NOTE - CARE PROVIDER_API CALL
David Carr)  Cardiovascular Disease  [address], [address]  [place] [address]  Phone: [phone]  Fax: [phone]  Follow Up Time: Routine    Jaya Max)  Otolaryngology  [address], [address]  [place] [address]  Phone: [phone]  Fax: [phone]  Follow Up Time:     Avila Beebe; PhD)  Neurology; Vascular Neurology  [address], [address]  [place] [address]  Phone: [phone]  Fax: [phone]  Follow Up Time: Routine

## 2021-06-02 NOTE — ED CDU PROVIDER DISPOSITION NOTE - PROVIDER TOKENS
PROVIDER:[TOKEN:[[id number]],FOLLOWUP:[Routine]],PROVIDER:[TOKEN:[[id number]]],PROVIDER:[TOKEN:[[id number]],FOLLOWUP:[Routine]] 167.9

## 2021-06-02 NOTE — PROGRESS NOTE ADULT - SUBJECTIVE AND OBJECTIVE BOX
[place] CARDIOLOGY-[facility]                                                               Office: [address] [address]                                                              Telephone: [phone]. Fax:[phone]                                                                             PROGRESS NOTE  Reason for follow up:   Overnight: No new events.   Update:     Subjective: "  ______________________"      	  Vitals:  T(C): 37 (06-02-21 @ 11:23), Max: 37.3 (06-02-21 @ 07:38)  HR: 75 (06-02-21 @ 11:23) (64 - 89)  BP: 107/66 (06-02-21 @ 11:23) (95/57 - 121/74)  RR: 16 (06-02-21 @ 11:23) (16 - 18)  SpO2: 98% (06-02-21 @ 11:23) (96% - 98%)  Wt(kg): --  I&O's Summary    Weight (kg): 65.8 (06-01 @ 13:39)      PHYSICAL EXAM:  Appearance: Comfortable. No acute distress  HEENT:  Head and neck: Atraumatic. Normocephalic.  Normal oral mucosa, PERRL, Neck is supple. No JVD, No carotid bruit.   Neurologic: A & O x 3, no focal deficits. EOMI.  Lymphatic: No cervical lymphadenopathy  Cardiovascular: Normal S1 S2, No murmur, rubs/gallops. No JVD, No edema  Respiratory: Lungs clear to auscultation  Gastrointestinal:  Soft, Non-tender, + BS  Lower Extremities: No edema  Psychiatry: Patient is calm. No agitation. Mood & affect appropriate  Skin: No rashes/ ecchymoses/cyanosis/ulcers visualized on the face, hands or feet.      CURRENT MEDICATIONS:    buPROPion XL  ketorolac   Injectable      DIAGNOSTIC TESTING:  [ ] Echocardiogram:   [ ]  Catheterization:  [ ] Stress Test:    OTHER: 	      LABS:	 	  CARDIAC MARKERS ( 01 Jun 2021 22:21 )  x     / <0.01 ng/mL / x     / x     / x      p-BNP 01 Jun 2021 22:21: x    , CARDIAC MARKERS ( 01 Jun 2021 18:09 )  x     / <0.01 ng/mL / x     / x     / x      p-BNP 01 Jun 2021 18:09: x    , CARDIAC MARKERS ( 01 Jun 2021 14:20 )  x     / <0.01 ng/mL / x     / x     / x      p-BNP 01 Jun 2021 14:20: x                              12.8   6.72  )-----------( 319      ( 01 Jun 2021 14:20 )             37.1     06-01    139  |  102  |  16.5  ----------------------------<  142<H>  3.9   |  21.0<L>  |  0.69    Ca    9.2      01 Jun 2021 14:20    TPro  6.8  /  Alb  4.4  /  TBili  0.3<L>  /  DBili  x   /  AST  28  /  ALT  22  /  AlkPhos  85  06-01    proBNP:   Lipid Profile: Date: 06-02 @ 05:33  Total cholesterol 203; Direct LDL: --; HDL: 99; Triglycerides:43    HgA1c:   TSH:       TELEMETRY: Reviewed    ECG:  Reviewed by me. 	                                                                      [place] CARDIOLOGY-[facility]                                                               Office: [address] [address]                                                              Telephone: [phone]. Fax:[phone]                                                                             PROGRESS NOTE  Reason for follow up:   Overnight: No new events.   Update: 6/2: Pt denies chest pain, palpitations, SOB. Pt c/o dizziness and headache. Toradol ordered, 500cc IVF. Tele- NSR HR @ 60-70s. Plan for CCTA today 6/2. Pending results of CCTA to determine further ischemia w/u.        Subjective: No       	  Vitals:  T(C): 37 (06-02-21 @ 11:23), Max: 37.3 (06-02-21 @ 07:38)  HR: 75 (06-02-21 @ 11:23) (64 - 89)  BP: 107/66 (06-02-21 @ 11:23) (95/57 - 121/74)  RR: 16 (06-02-21 @ 11:23) (16 - 18)  SpO2: 98% (06-02-21 @ 11:23) (96% - 98%)    I&O's Summary    Weight (kg): 65.8 (06-01 @ 13:39)      PHYSICAL EXAM:  Appearance: Comfortable. No acute distress  HEENT:  Head and neck: Atraumatic. Normocephalic.  Normal oral mucosa, PERRL, Neck is supple. No JVD, No carotid bruit.   Neurologic: A & O x 3, no focal deficits. EOMI.  Lymphatic: No cervical lymphadenopathy  Cardiovascular: Normal S1 S2, No murmur, rubs/gallops. No JVD, No edema  Respiratory: Lungs clear to auscultation  Gastrointestinal:  Soft, Non-tender, + BS  Lower Extremities: No edema  Psychiatry: Patient is calm. No agitation. Mood & affect appropriate  Skin: No rashes/ ecchymoses/cyanosis/ulcers visualized on the face, hands or feet.      CURRENT MEDICATIONS:  buPROPion XL  ketorolac   Injectable      DIAGNOSTIC TESTING:    [ ] Echocardiogram: < from: TTE Echo Complete w/o Contrast w/ Doppler (06.01.21 @ 18:01) >  Summary:   1. Normal left ventricular size and wall thicknesses, with normal systolic and diastolic function.   2. Left ventricular ejection fraction, by visual estimation, is 65 to 70%.   3. Normal right ventricular size and function.   4. The left atrium is normal in size.   5. The right atrium is normal in size.   6. Normal trileaflet aortic valve with normal opening.   7. Trace mitral valve regurgitation.   8. Mild tricuspid regurgitation.      OTHER:   	  MR Head:< from: MR Head No Cont (06.02.21 @ 06:28) >  IMPRESSION:    No evidence for intracranial mass, acute territorial infarct, acute intracranial hemorrhage, or midline shift.    XRAY:< from: Xray Chest 1 View- PORTABLE-Urgent (06.01.21 @ 14:39) >  IMPRESSION:  No acute radiographic findings      LABS:	 	  CARDIAC MARKERS ( 01 Jun 2021 22:21 )  x     / <0.01 ng/mL / x     / x     / x      p-BNP 01 Jun 2021 22:21: x    , CARDIAC MARKERS ( 01 Jun 2021 18:09 )  x     / <0.01 ng/mL / x     / x     / x      p-BNP 01 Jun 2021 18:09: x    , CARDIAC MARKERS ( 01 Jun 2021 14:20 )  x     / <0.01 ng/mL / x     / x     / x      p-BNP 01 Jun 2021 14:20: x                              12.8   6.72  )-----------( 319      ( 01 Jun 2021 14:20 )             37.1     06-01    139  |  102  |  16.5  ----------------------------<  142<H>  3.9   |  21.0<L>  |  0.69    Ca    9.2      01 Jun 2021 14:20    TPro  6.8  /  Alb  4.4  /  TBili  0.3<L>  /  DBili  x   /  AST  28  /  ALT  22  /  AlkPhos  85  06-01      Lipid Profile: Date: 06-02 @ 05:33  Total cholesterol 203; Direct LDL: --; HDL: 99; Triglycerides:43      TELEMETRY: NSR HR @ 60-70s

## 2021-06-02 NOTE — ED CDU PROVIDER DISPOSITION NOTE - PATIENT PORTAL LINK FT
You can access the FollowMyHealth Patient Portal offered by [facility] by registering at the following website: [email or web address]. By joining [employer]’s FollowMyHealth portal, you will also be able to view your health information using other applications (apps) compatible with our system.

## 2021-06-02 NOTE — ED ADULT NURSE REASSESSMENT NOTE - NS ED NURSE REASSESS COMMENT FT1
Pt has had no issues in OBS over night and reports that they feel better. Pt was able to sleep and is currently comfortable without complaints. Pt is AAOx4, in NAD and with VSS.
Pt resting comfortably in stretcher, no complaints at this time.
Received pt from Miguel LUGO. Pt pending CTAC and TTE in the AM. Pt AAOx4, comfortable and has no complaints at this time. Pt updated on plan of care.
Assumed care of the patient @0730. Pt denies chest pain at this time. Pt A&Ox4. Normal sinus rhythm on monitor. Patient in understanding of plan of care. Patient with no further questions for the RN. Resting in comfort. Call bell within reach and encouraged to use when assistance needed. Will continue to monitor.

## 2021-06-02 NOTE — PROGRESS NOTE ADULT - SUBJECTIVE AND OBJECTIVE BOX
[facility]                                        Neurology at [place]                                  [facility]                                      [address]                                           [place], [place], [address]                                                [phone]        CC: Dizziness.     HISTORY:  The patient is a 60y Female who presented with the complaint of dizziness.   She reports that the onset was around 8 am. She felt associated nausea and had some pressure in her chest.   She described some tingling down her left arm.   Upon arrival in the ER the stroke code was activated and I had spoken with the ER team Emily Root (resident)/[facility] (attending).     She is currently feeling much better although not quite back to normal. (JW)    Interval history: feeling better with dizziness, (+) c/o headache    Review of systems (neurology): (+) headache. No dizziness. Denies weakness/numbness.  Denies speech/language deficits. Denies diplopia/blurred vision.  Denies confusion    MEDICATIONS  (STANDING):  buPROPion  milliGRAM(s) Oral daily  metoclopramide Injectable 10 milliGRAM(s) IV Push once    MEDICATIONS  (PRN):  meclizine 25 milliGRAM(s) Oral every 8 hours PRN Dizziness      Vital Signs Last 24 Hrs  T(C): 37 (02 Jun 2021 11:23), Max: 37.3 (02 Jun 2021 07:38)  T(F): 98.6 (02 Jun 2021 11:23), Max: 99.2 (02 Jun 2021 07:38)  HR: 60 (02 Jun 2021 12:23) (60 - 75)  BP: 108/66 (02 Jun 2021 12:23) (95/57 - 108/66)  BP(mean): --  RR: 17 (02 Jun 2021 12:23) (16 - 18)  SpO2: 98% (02 Jun 2021 12:23) (96% - 98%)    Detailed Neuro exam:    Mental status: The patient is awake, alert, and fully oriented. There is no aphasia. Attention span is normal. Patient is aware of current events.     Cranial nerves: There is no papilledema. Pupils react symmetrically to light. There is no visual field deficit to confrontation. Extraocular motion is full with no nystagmus.  Facial sensation is intact. Facial musculature is symmetric. Palate elevates symmetrically. Tongue is midline.    Motor: There is normal bulk and tone.  Strength is 5/5 in the right arm and leg.   Strength is 5/5 in the left arm and leg.    Sensation: Intact to light touch and pin. There is no extinction to double simultaneous stimulation.    Reflexes: 2+ throughout and plantar responses are flexor.    Cerebellar: There is no dysmetria on finger to nose testing.      LABS:                           12.8   6.72  )-----------( 319      ( 01 Jun 2021 14:20 )             37.1     06-02    141  |  106  |  17.6  ----------------------------<  95  4.2   |  26.0  |  0.64    Ca    8.4<L>      02 Jun 2021 12:33    TPro  6.8  /  Alb  4.4  /  TBili  0.3<L>  /  DBili  x   /  AST  28  /  ALT  22  /  AlkPhos  85  06-01    LIVER FUNCTIONS - ( 01 Jun 2021 14:20 )  Alb: 4.4 g/dL / Pro: 6.8 g/dL / ALK PHOS: 85 U/L / ALT: 22 U/L / AST: 28 U/L / GGT: x           PT/INR - ( 01 Jun 2021 14:20 )   PT: 11.2 sec;   INR: 0.96 ratio         PTT - ( 01 Jun 2021 14:20 )  PTT:27.4 sec    Lipid Profile in AM (06.02.21 @ 05:33)    Cholesterol, Serum: 203 mg/dL    Triglycerides, Serum: 43 mg/dL    HDL Cholesterol, Serum: 99 mg/dL    Non HDL Cholesterol: 104: Patients Atherosclerotic Cardiovascular Disease (ASCVD) Risk  Optimal Level (mg/dL)  LDL Cholesterol (LDL-C)  All Patients                                < 100  ASCVD at Very High Risk1    < 70  Non-HDL Cholesterol (Non-HDL-C)  All Patients                       < 130  ASCVD at Very High Risk1   < 100  Non-HDL-Cholesterol (Non-HDL-C) is also a key target for cardiovascular  risk reduction.  Consider Familial Hypercholesterolemia when: LDL-C > 190 mg/dL or  Non-HDL-C > 220 mg/dL.  LDL-C calculation using the Friedewald equation is not provided when  triglycerides > 400 mg/dL, in which case we recommend repeating the test  after fasting, if it was not done before.  When triglycerides >150 mg/dL, calculated LDL-C is provided but maystill  be inaccurate (particularly when LDL-C < 70 mg/dL). It can be  recalculated off-line using other equations (e.g. Tyrese SS, Claudia TORRES,  Zina FRAZIER, et al.RUDOLPH 2013;310:2061 - 8).  1 Philip Downing,et al. "2019 AHA/ACC. . . guideline on the  management of blood cholesterol: a report of the American College of  Cardiology/American Heart Association Task Force on Clinical Practice  Guidelines." Circulation;139:e1082 - e1143.  These values apply only to persons 20 years and older.  Lipid Panel updated with new test, reference ranges and interpretive  comments effective 10-. mg/dL    LDL Cholesterol Calculated: 95 mg/dL        RADIOLOGY   MRI brain- no acute CVA, mass or blood    CT head images reviewed (and concur with report): There is no acute pathology.   CT-A negative for large vessel occlusion.  CT-P negative for core/penumbra.

## 2021-06-02 NOTE — PROGRESS NOTE ADULT - ATTENDING COMMENTS
pt seen and examined  she has a migraine today  CTA chest negative for CAD.  Pt is to follow with neurology.  Cont with home meds.   Can FU with me as outpt on prn basis for now.

## 2021-06-02 NOTE — PROGRESS NOTE ADULT - ASSESSMENT
The patient is a 60y Female with with dizziness and left arm paresthesias in setting of left sided chest pressure.     Dizziness.   MRI brain did not show stroke  Aspirin daily.       Left arm tingling.   Resolved.   Would suggest cardiac work up for ischemia.     No further neuro workup suggested at this time.    Thank you for allowing me to participate in the care of your patient    Avila Beebe MD, PhD   [id number]  
Pt is a 59 y/o female with hx of depression who presents to [facility]ED for chest pain and dizziness. Pt states that this morning she had 2 episodes of nausea and vomitting. Pt continued to feel unwell but she had to get her taxes done so she drove, but as she was driving she started feeling left arm tingling/numbness with associated chest pressure/tighness. Pt called neighbor to take her to hospital because she felt as though she was having a heart attack. While patient was being driven to hospital, she vomitted again and felt dizziness and neighbor stopped vehicle and called 911.  In ED, Trop#1-negative, ECG-NSR HR @ 83, CT Head- Mild volume loss, microvascular disease, no acute hemorrhage or midline shift. Suboptimal positioning, Xray-No acute radiographic findings.     6/2: Pt denies chest pain, palpitations, SOB. Pt c/o dizziness and headache. Toradol ordered, 500cc IVF. Tele- NSR HR @ 60-70s. Plan for CCTA today 6/2. Pending results of CCTA to determine further ischemia w/u.      Chest Pain  -Trop#1-negative  -Xray-No acute radiographic findings  -Plan for CCTA today 6/2  -Lopressor 100mg ordered to be given once prior to CCTA  -Cont. tele monitoring  -FLP-Total cholesterol 203; Direct LDL: --; HDL: 99; Triglycerides:43    Dizziness  -CT-Mild volume loss, microvascular disease, no acute hemorrhage or midline shift. Suboptimal positioning  -MR Head-No evidence for intracranial mass, acute territorial infarct, acute intracranial hemorrhage, or midline shift   -Pt on standing dose meclizine

## 2021-06-02 NOTE — ED CDU PROVIDER DISPOSITION NOTE - NSFOLLOWUPINSTRUCTIONS_ED_ALL_ED_FT
please continue daily medication   please call and follow up with primary care within 1-2 days and bring the result   take aspirin daily as prescribed recommend by neurology   take meclizine as need it for the dizziness  please call and follow up with cardiology within 3 weeks    please call and follow up with ENT and neurology as well  - come back in ER if any worsen of the symptoms , dizziness , fever , weakness, visual changes or any new concern     Dizziness    Dizziness can manifest as a feeling of unsteadiness or light-headedness. You may feel like you are about to faint. This condition can be caused by a number of things, including medicines, dehydration, or illness. Drink enough fluid to keep your urine clear or pale yellow. Do not drink alcohol and limit your caffeine intake. Avoid quick or sudden movements.  Rise slowly from chairs and steady yourself until you feel okay. In the morning, first sit up on the side of the bed.    SEEK IMMEDIATE MEDICAL CARE IF YOU HAVE ANY OF THE FOLLOWING SYMPTOMS: vomiting, changes in your vision or speech, weakness in your arms or legs, trouble speaking or swallowing, chest pain, abdominal pain, shortness of breath, sweating, bleeding, headache, neck pain, or fever.    Chest Pain    Chest pain can be caused by many different conditions which may or may not be dangerous. Causes include heartburn, lung infections, heart attack, blood clot in lungs, skin infections, strain or damage to muscle, cartilage, or bones, etc. In addition to a history and physical examination, an electrocardiogram (ECG) or other lab tests may have been performed to determine the cause of your chest pain. Follow up with your primary care provider or with a cardiologist as instructed.     SEEK IMMEDIATE MEDICAL CARE IF YOU HAVE ANY OF THE FOLLOWING SYMPTOMS: worsening chest pain, coughing up blood, unexplained back/neck/jaw pain, severe abdominal pain, dizziness or lightheadedness, fainting, shortness of breath, sweaty or clammy skin, vomiting, or racing heart beat. These symptoms may represent a serious problem that is an emergency. Do not wait to see if the symptoms will go away. Get medical help right away. Call 911 and do not drive yourself to the hospital.

## 2021-06-02 NOTE — ED CDU PROVIDER SUBSEQUENT DAY NOTE - ATTENDING CONTRIBUTION TO CARE
I, Jossy Sheridan, performed a face to face bedside interview with this patient regarding history of present illness, review of symptoms and relevant past medical, social and family history.  I completed an independent physical examination. Medical decision making, follow-up on ordered tests (ie labs, radiologic studies) and re-evaluation of the patient's status has been communicated to the ACP.  Disposition of the patient will be based on test outcome and response to ED interventions.     Pt with intermittent dizziness, much improved up walkin and eat but at times still feels slightly dizzy. has h/o motiion sickness. no HA. no recurrence of CP had isolated episode with onset of dizziness/nausea/vomiting. EKG normal. trops negative, TTE normal, MR negatie. will give prn meclizine. pending CT cardiac and d/c

## 2021-06-02 NOTE — ED CDU PROVIDER DISPOSITION NOTE - CARE PROVIDERS DIRECT ADDRESSES
[email or web address]

## 2021-06-02 NOTE — ED CDU PROVIDER DISPOSITION NOTE - ATTENDING CONTRIBUTION TO CARE
I, Simon Vidal, performed a face to face bedside interview with this patient regarding history of present illness, review of symptoms and relevant past medical, social and family history.  I completed an independent physical examination. I have communicated the patient’s plan of care and disposition with the ACP.  Pt place don Obs for chest pain and dizziness. Feels better, neuro intact and ambulatory, MR mckinnon dneg, CTCA neg and cleared by cardio  Gen: NAD, well appearing  CV: RRR  Pul: CTA b/l  Abd: Soft, non-distended, non-tender  Neuro: no focal deficits  Pt improved, stable for dc

## 2021-06-03 PROBLEM — Z00.00 ENCOUNTER FOR PREVENTIVE HEALTH EXAMINATION: Status: ACTIVE | Noted: 2021-06-03

## 2021-06-04 LAB
CULTURE RESULTS: SIGNIFICANT CHANGE UP
SPECIMEN SOURCE: SIGNIFICANT CHANGE UP
